# Patient Record
Sex: MALE | Race: WHITE | Employment: UNEMPLOYED | URBAN - METROPOLITAN AREA
[De-identification: names, ages, dates, MRNs, and addresses within clinical notes are randomized per-mention and may not be internally consistent; named-entity substitution may affect disease eponyms.]

---

## 2023-01-16 ENCOUNTER — APPOINTMENT (OUTPATIENT)
Dept: GENERAL RADIOLOGY | Age: 18
End: 2023-01-16
Payer: COMMERCIAL

## 2023-01-16 ENCOUNTER — HOSPITAL ENCOUNTER (EMERGENCY)
Age: 18
Discharge: HOME | End: 2023-01-16
Attending: EMERGENCY MEDICINE
Payer: COMMERCIAL

## 2023-01-16 VITALS
HEART RATE: 72 BPM | HEIGHT: 72 IN | DIASTOLIC BLOOD PRESSURE: 78 MMHG | TEMPERATURE: 98.1 F | RESPIRATION RATE: 18 BRPM | WEIGHT: 170 LBS | SYSTOLIC BLOOD PRESSURE: 125 MMHG | BODY MASS INDEX: 23.03 KG/M2 | OXYGEN SATURATION: 97 %

## 2023-01-16 DIAGNOSIS — S61.012A LACERATION OF LEFT THUMB WITHOUT FOREIGN BODY WITHOUT DAMAGE TO NAIL, INITIAL ENCOUNTER: Primary | ICD-10-CM

## 2023-01-16 PROCEDURE — 12042 INTMD RPR N-HF/GENIT2.6-7.5: CPT

## 2023-01-16 PROCEDURE — 99284 EMERGENCY DEPT VISIT MOD MDM: CPT

## 2023-01-16 PROCEDURE — 73120 X-RAY EXAM OF HAND: CPT

## 2023-01-16 PROCEDURE — 90471 IMMUNIZATION ADMIN: CPT | Performed by: EMERGENCY MEDICINE

## 2023-01-16 PROCEDURE — 6360000002 HC RX W HCPCS: Performed by: EMERGENCY MEDICINE

## 2023-01-16 PROCEDURE — 90714 TD VACC NO PRESV 7 YRS+ IM: CPT | Performed by: EMERGENCY MEDICINE

## 2023-01-16 RX ORDER — TRAMADOL HYDROCHLORIDE 50 MG/1
50 TABLET ORAL EVERY 8 HOURS PRN
Qty: 6 TABLET | Refills: 0 | Status: SHIPPED | OUTPATIENT
Start: 2023-01-16 | End: 2023-01-18

## 2023-01-16 RX ADMIN — CLOSTRIDIUM TETANI TOXOID ANTIGEN (FORMALDEHYDE INACTIVATED) AND CORYNEBACTERIUM DIPHTHERIAE TOXOID ANTIGEN (FORMALDEHYDE INACTIVATED) 0.5 ML: 5; 2 INJECTION, SUSPENSION INTRAMUSCULAR at 21:41

## 2023-01-16 ASSESSMENT — PAIN DESCRIPTION - PAIN TYPE: TYPE: ACUTE PAIN

## 2023-01-16 ASSESSMENT — PAIN DESCRIPTION - LOCATION: LOCATION: HAND

## 2023-01-16 ASSESSMENT — PAIN DESCRIPTION - ORIENTATION: ORIENTATION: LEFT

## 2023-01-16 ASSESSMENT — PAIN DESCRIPTION - ONSET: ONSET: SUDDEN

## 2023-01-16 ASSESSMENT — PAIN SCALES - GENERAL: PAINLEVEL_OUTOF10: 7

## 2023-01-16 ASSESSMENT — PAIN - FUNCTIONAL ASSESSMENT: PAIN_FUNCTIONAL_ASSESSMENT: 0-10

## 2023-01-16 ASSESSMENT — LIFESTYLE VARIABLES
HOW OFTEN DO YOU HAVE A DRINK CONTAINING ALCOHOL: NEVER
HOW MANY STANDARD DRINKS CONTAINING ALCOHOL DO YOU HAVE ON A TYPICAL DAY: PATIENT DOES NOT DRINK

## 2023-01-16 ASSESSMENT — PAIN DESCRIPTION - FREQUENCY: FREQUENCY: CONTINUOUS

## 2023-01-17 NOTE — ED PROVIDER NOTES
Emergency Department Provider Note                   PCP:                No primary care provider on file. Age: 21 y.o. Sex: male     No diagnosis found. DISPOSITION          Medical Decision Making  Healthy 78-year-old white male presents with laceration to his left thumb. He excellently stumbled and fell into a table saw. Tetanus status is unknown. Injury occurred just prior to arrival.  No other complaints at this time. History obtained from patient and his father. Exam well-nourished white male in no distress. Normocephalic/atraumatic. Cardiovascular regular rate and rhythm. Normal respiratory effort. Left thumb has 2 jagged lacerations on the volar pad. No joint or nail involvement. Total length for the 2 lacerations 4 cm. ED course. Differential includes simple laceration, open fracture. X-ray was obtained and shows no evidence of bone involvement. X-ray was independently reviewed by me. Radiology report also shows no acute abnormality. Laceration has been repaired. Tetanus updated. Wound is dressed. Patient discharged home. Amount and/or Complexity of Data Reviewed  Independent Historian: parent  Radiology: ordered. Risk  Prescription drug management. Orders Placed This Encounter   Procedures    XR HAND LEFT (2 VIEWS)        Medications   tetanus & diphtheria toxoids (adult) 5-2 LFU injection 0.5 mL (0.5 mLs IntraMUSCular Given 1/16/23 2141)       New Prescriptions    No medications on file        Quang Henderson is a 21 y.o. male who presents to the Emergency Department with chief complaint of    Chief Complaint   Patient presents with    Laceration      HPI      Review of Systems    History reviewed. No pertinent past medical history. History reviewed. No pertinent surgical history. History reviewed. No pertinent family history.      Social History     Tobacco Use    Smoking status: Never    Smokeless tobacco: Never   Substance and Sexual Activity    Alcohol use: Never    Drug use: Never         Yellow jacket venom     Previous Medications    No medications on file        Vitals signs and nursing note reviewed. Patient Vitals for the past 4 hrs:   Temp Pulse Resp BP SpO2   01/16/23 2045 98.1 °F (36.7 °C) 72 18 125/78 97 %          Physical Exam     Lac Repair    Date/Time: 1/16/2023 9:45 PM  Performed by: Kei Malloy MD  Authorized by: Kei Malloy MD     Consent:     Consent obtained:  Verbal    Consent given by:  Parent    Risks, benefits, and alternatives were discussed: yes      Risks discussed:  Pain  Universal protocol:     Patient identity confirmed:  Verbally with patient  Anesthesia:     Anesthesia method:  Local infiltration    Local anesthetic:  Lidocaine 1% w/o epi  Laceration details:     Location:  Finger    Finger location:  L thumb    Length (cm):  4  Pre-procedure details:     Preparation:  Patient was prepped and draped in usual sterile fashion  Exploration:     Imaging obtained: x-ray      Wound exploration: wound explored through full range of motion      Wound extent: no muscle damage noted, no tendon damage noted, no underlying fracture noted and no vascular damage noted      Contaminated: no    Treatment:     Area cleansed with:  Povidone-iodine    Amount of cleaning:  Standard    Irrigation solution:  Sterile saline    Irrigation method:  Syringe    Debridement:  None    Undermining:  None    Layers/structures repaired:  Deep subcutaneous  Skin repair:     Repair method:  Sutures    Suture size:  3-0    Suture material:  Nylon    Suture technique:  Running    Number of sutures:  11  Approximation:     Approximation:  Loose  Repair type:     Repair type:   Intermediate  Post-procedure details:     Dressing:  Antibiotic ointment and adhesive bandage    Procedure completion:  Tolerated    Results for orders placed or performed during the hospital encounter of 01/16/23   XR HAND LEFT (2 VIEWS)    Narrative    LEFT ANKLE SERIES 1/16/2023 9:02 PM    HISTORY: possible foreign object in skin    COMPARISON:  None available. FINDINGS: The ankle mortise is well aligned. There is no displaced fracture. Soft tissues are normal.  There is no radiopaque foreign body. Impression    No acute findings. XR HAND LEFT (2 VIEWS)   Final Result   No acute findings. Voice dictation software was used during the making of this note. This software is not perfect and grammatical and other typographical errors may be present. This note has not been completely proofread for errors.      Nery Bond MD  01/16/23 8931       Nery Bond MD  01/16/23 8781

## 2023-01-17 NOTE — ED NOTES
I have reviewed discharge instructions with the patient.  The patient verbalized understanding.    Patient left ED via Discharge Method: ambulatory to Home with self     Opportunity for questions and clarification provided.       Patient given 1 scripts.         To continue your aftercare when you leave the hospital, you may receive an automated call from our care team to check in on how you are doing.  This is a free service and part of our promise to provide the best care and service to meet your aftercare needs.” If you have questions, or wish to unsubscribe from this service please call [phone].  Thank you for Choosing our [facility] Emergency Department.        Heather Vieira RN  01/16/23 4354

## 2023-01-17 NOTE — ED TRIAGE NOTES
Pt arrives A&Ox4 ambulatory. Pt cut L thumb with saw. Pt states last tetanus more than 5 years ago. Bandage applied in triage.

## 2023-01-23 ENCOUNTER — APPOINTMENT (OUTPATIENT)
Dept: GENERAL RADIOLOGY | Age: 18
End: 2023-01-23
Payer: COMMERCIAL

## 2023-01-23 ENCOUNTER — HOSPITAL ENCOUNTER (EMERGENCY)
Age: 18
Discharge: HOME | End: 2023-01-23
Attending: EMERGENCY MEDICINE
Payer: COMMERCIAL

## 2023-01-23 VITALS
BODY MASS INDEX: 23.03 KG/M2 | DIASTOLIC BLOOD PRESSURE: 82 MMHG | TEMPERATURE: 98.2 F | OXYGEN SATURATION: 100 % | SYSTOLIC BLOOD PRESSURE: 144 MMHG | WEIGHT: 170 LBS | HEIGHT: 72 IN | HEART RATE: 73 BPM | RESPIRATION RATE: 17 BRPM

## 2023-01-23 DIAGNOSIS — S20.212A CONTUSION OF LEFT CHEST WALL, INITIAL ENCOUNTER: ICD-10-CM
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
## 2023-01-23 PROCEDURE — 99283 EMERGENCY DEPT VISIT LOW MDM: CPT | Performed by: EMERGENCY MEDICINE

## 2023-01-23 PROCEDURE — 73010 X-RAY EXAM OF SHOULDER BLADE: CPT

## 2023-01-23 PROCEDURE — 71101 X-RAY EXAM UNILAT RIBS/CHEST: CPT

## 2023-01-23 RX ORDER — NAPROXEN 500 MG/1
500 TABLET ORAL 2 TIMES DAILY WITH MEALS
Qty: 30 TABLET | Refills: 0 | Status: SHIPPED | OUTPATIENT
Start: 2023-01-23

## 2023-01-23 ASSESSMENT — PAIN DESCRIPTION - ORIENTATION: ORIENTATION: LEFT

## 2023-01-23 ASSESSMENT — PAIN DESCRIPTION - PAIN TYPE: TYPE: ACUTE PAIN

## 2023-01-23 ASSESSMENT — PAIN - FUNCTIONAL ASSESSMENT: PAIN_FUNCTIONAL_ASSESSMENT: 0-10

## 2023-01-23 ASSESSMENT — PAIN DESCRIPTION - LOCATION: LOCATION: SHOULDER;RIB CAGE

## 2023-01-23 ASSESSMENT — PAIN SCALES - GENERAL: PAINLEVEL_OUTOF10: 5

## 2023-01-23 NOTE — Clinical Note
Arya Cuenca was seen and treated in our emergency department on 1/23/2023. He may return to work on 01/24/2023. If you have any questions or concerns, please don't hesitate to call.       Yue Nolan MD
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
## 2023-01-23 NOTE — Clinical Note
Marcio Harmon was seen and treated in our emergency department on 1/23/2023. He may return to school on 01/24/2023. If you have any questions or concerns, please don't hesitate to call.       Estefany Andrade MD
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
## 2023-01-23 NOTE — Clinical Note
Sophia Berg was seen and treated in our emergency department on 1/23/2023. He may return to work on 01/24/2023. If you have any questions or concerns, please don't hesitate to call.       Christopher Howell MD

## 2023-01-23 NOTE — Clinical Note
Walter Chowdary was seen and treated in our emergency department on 1/23/2023. He may return to school on 01/24/2023. If you have any questions or concerns, please don't hesitate to call.       Michelle Bailey MD

## 2023-01-23 NOTE — DISCHARGE INSTRUCTIONS
Ice your chest wall for 20 minutes at a time, 4 times a day.  Take the naproxen as needed for pain.  Your neck and back may hurt more tomorrow morning than they do today.  This is normal.  Continue to ice areas of discomfort and take the naproxen as needed.

## 2023-01-23 NOTE — ED NOTES
I have reviewed discharge instructions with the patient and parent. The patient and parent verbalized understanding. Patient left ED via Discharge Method: ambulatory to Home with father. Opportunity for questions and clarification provided. Patient given 1 scripts. To continue your aftercare when you leave the hospital, you may receive an automated call from our care team to check in on how you are doing. This is a free service and part of our promise to provide the best care and service to meet your aftercare needs.  If you have questions, or wish to unsubscribe from this service please call [phone]. Thank you for Choosing our [facility] Emergency Department.       Ele Reeder RN  01/23/23 9590